# Patient Record
Sex: FEMALE | Race: WHITE | NOT HISPANIC OR LATINO | Employment: FULL TIME | URBAN - METROPOLITAN AREA
[De-identification: names, ages, dates, MRNs, and addresses within clinical notes are randomized per-mention and may not be internally consistent; named-entity substitution may affect disease eponyms.]

---

## 2018-06-25 ENCOUNTER — HOSPITAL ENCOUNTER (EMERGENCY)
Facility: HOSPITAL | Age: 53
Discharge: HOME | End: 2018-06-25
Attending: EMERGENCY MEDICINE
Payer: COMMERCIAL

## 2018-06-25 ENCOUNTER — APPOINTMENT (EMERGENCY)
Dept: CT IMAGING | Facility: HOSPITAL | Age: 53
End: 2018-06-25
Payer: COMMERCIAL

## 2018-06-25 VITALS
OXYGEN SATURATION: 97 % | HEART RATE: 73 BPM | TEMPERATURE: 98.2 F | RESPIRATION RATE: 18 BRPM | DIASTOLIC BLOOD PRESSURE: 78 MMHG | SYSTOLIC BLOOD PRESSURE: 121 MMHG

## 2018-06-25 DIAGNOSIS — H81.10 BENIGN POSITIONAL VERTIGO: Primary | ICD-10-CM

## 2018-06-25 LAB
ANION GAP SERPL CALCULATED.3IONS-SCNC: 7 MMOL/L (ref 4–13)
BASOPHILS # BLD AUTO: 0 THOUSANDS/ΜL (ref 0–0.1)
BASOPHILS NFR BLD AUTO: 0 % (ref 0–1)
BUN SERPL-MCNC: 18 MG/DL (ref 7–25)
CALCIUM SERPL-MCNC: 9.4 MG/DL (ref 8.6–10.5)
CHLORIDE SERPL-SCNC: 106 MMOL/L (ref 98–107)
CO2 SERPL-SCNC: 24 MMOL/L (ref 21–31)
CREAT SERPL-MCNC: 1.13 MG/DL (ref 0.6–1.2)
EOSINOPHIL # BLD AUTO: 0.4 THOUSAND/ΜL (ref 0–0.61)
EOSINOPHIL NFR BLD AUTO: 5 % (ref 0–6)
ERYTHROCYTE [DISTWIDTH] IN BLOOD BY AUTOMATED COUNT: 13.4 % (ref 11.6–15.1)
GFR SERPL CREATININE-BSD FRML MDRD: 56 ML/MIN/1.73SQ M
GLUCOSE SERPL-MCNC: 106 MG/DL (ref 65–140)
GLUCOSE SERPL-MCNC: 108 MG/DL (ref 65–140)
HCT VFR BLD AUTO: 41.2 % (ref 37–47)
HGB BLD-MCNC: 14.3 G/DL (ref 11.5–15.4)
LYMPHOCYTES # BLD AUTO: 2.9 THOUSANDS/ΜL (ref 0.6–4.47)
LYMPHOCYTES NFR BLD AUTO: 40 % (ref 14–44)
MCH RBC QN AUTO: 32.2 PG (ref 26.8–34.3)
MCHC RBC AUTO-ENTMCNC: 34.8 G/DL (ref 31.4–37.4)
MCV RBC AUTO: 92 FL (ref 82–98)
MONOCYTES # BLD AUTO: 0.5 THOUSAND/ΜL (ref 0.17–1.22)
MONOCYTES NFR BLD AUTO: 7 % (ref 4–12)
NEUTROPHILS # BLD AUTO: 3.5 THOUSANDS/ΜL (ref 1.85–7.62)
NEUTS SEG NFR BLD AUTO: 48 % (ref 43–75)
NRBC BLD AUTO-RTO: 1 /100 WBCS
PLATELET # BLD AUTO: 211 THOUSANDS/UL (ref 149–390)
PMV BLD AUTO: 9.7 FL (ref 8.9–12.7)
POTASSIUM SERPL-SCNC: 3.6 MMOL/L (ref 3.5–5.5)
RBC # BLD AUTO: 4.46 MILLION/UL (ref 3.81–5.12)
SODIUM SERPL-SCNC: 137 MMOL/L (ref 134–143)
WBC # BLD AUTO: 7.2 THOUSAND/UL (ref 4.31–10.16)

## 2018-06-25 PROCEDURE — 82948 REAGENT STRIP/BLOOD GLUCOSE: CPT

## 2018-06-25 PROCEDURE — 96374 THER/PROPH/DIAG INJ IV PUSH: CPT

## 2018-06-25 PROCEDURE — 99285 EMERGENCY DEPT VISIT HI MDM: CPT

## 2018-06-25 PROCEDURE — 85025 COMPLETE CBC W/AUTO DIFF WBC: CPT | Performed by: EMERGENCY MEDICINE

## 2018-06-25 PROCEDURE — 70498 CT ANGIOGRAPHY NECK: CPT

## 2018-06-25 PROCEDURE — 70450 CT HEAD/BRAIN W/O DYE: CPT

## 2018-06-25 PROCEDURE — 80048 BASIC METABOLIC PNL TOTAL CA: CPT | Performed by: EMERGENCY MEDICINE

## 2018-06-25 PROCEDURE — 36415 COLL VENOUS BLD VENIPUNCTURE: CPT | Performed by: EMERGENCY MEDICINE

## 2018-06-25 PROCEDURE — 70496 CT ANGIOGRAPHY HEAD: CPT

## 2018-06-25 PROCEDURE — 96361 HYDRATE IV INFUSION ADD-ON: CPT

## 2018-06-25 RX ORDER — MECLIZINE HYDROCHLORIDE 25 MG/1
25 TABLET ORAL ONCE
Status: COMPLETED | OUTPATIENT
Start: 2018-06-25 | End: 2018-06-25

## 2018-06-25 RX ORDER — MECLIZINE HYDROCHLORIDE 25 MG/1
25 TABLET ORAL EVERY 8 HOURS
Qty: 30 TABLET | Refills: 0 | Status: SHIPPED | OUTPATIENT
Start: 2018-06-25

## 2018-06-25 RX ORDER — SODIUM CHLORIDE 9 MG/ML
1000 INJECTION, SOLUTION INTRAVENOUS CONTINUOUS
Status: DISCONTINUED | OUTPATIENT
Start: 2018-06-25 | End: 2018-06-25 | Stop reason: HOSPADM

## 2018-06-25 RX ORDER — DIPHENOXYLATE HYDROCHLORIDE AND ATROPINE SULFATE 2.5; .025 MG/1; MG/1
1 TABLET ORAL DAILY
COMMUNITY

## 2018-06-25 RX ORDER — MECLIZINE HYDROCHLORIDE 25 MG/1
TABLET ORAL
Status: DISCONTINUED
Start: 2018-06-25 | End: 2018-06-25 | Stop reason: HOSPADM

## 2018-06-25 RX ORDER — ONDANSETRON 2 MG/ML
INJECTION INTRAMUSCULAR; INTRAVENOUS
Status: COMPLETED
Start: 2018-06-25 | End: 2018-06-25

## 2018-06-25 RX ORDER — ONDANSETRON 2 MG/ML
4 INJECTION INTRAMUSCULAR; INTRAVENOUS ONCE
Status: COMPLETED | OUTPATIENT
Start: 2018-06-25 | End: 2018-06-25

## 2018-06-25 RX ADMIN — ONDANSETRON 4 MG: 2 INJECTION INTRAMUSCULAR; INTRAVENOUS at 02:24

## 2018-06-25 RX ADMIN — IOHEXOL 100 ML: 350 INJECTION, SOLUTION INTRAVENOUS at 04:17

## 2018-06-25 RX ADMIN — SODIUM CHLORIDE 1000 ML/HR: 0.9 INJECTION, SOLUTION INTRAVENOUS at 04:14

## 2018-06-25 RX ADMIN — ONDANSETRON HYDROCHLORIDE 4 MG: 2 INJECTION, SOLUTION INTRAVENOUS at 02:24

## 2018-06-25 RX ADMIN — MECLIZINE HYDROCHLORIDE 25 MG: 25 TABLET ORAL at 02:24

## 2018-06-25 NOTE — ED NOTES
Patient reports after administration of medications she is starting to feel better - she feels more like she did this morning when the dizziness first started and she could tolerate and function despite the dizziness     Murali Lanza RN  06/25/18 9085

## 2018-06-25 NOTE — ED PROVIDER NOTES
History  Chief Complaint   Patient presents with    Dizziness     reports dizzy since this am - worse this past 1/2 hour - had episode of dizziness back in November that last for 1 week     47year old pt presents with dizziness episode that started earlier yesterday at 6 AM    Pt felt dizzy, off balance  Symptoms persisted and worsened until her arrival to ER  Also complaint of nausea, no tinnituis  Pt has had a prior episode 11 2017 that lasted for one week and self resolved  She did not see a clinician at that time  Prior to Admission Medications   Prescriptions Last Dose Informant Patient Reported? Taking?   multivitamin (THERAGRAN) TABS   Yes Yes   Sig: Take 1 tablet by mouth daily      Facility-Administered Medications: None       History reviewed  No pertinent past medical history  Past Surgical History:   Procedure Laterality Date    TUBAL LIGATION         No family history on file  I have reviewed and agree with the history as documented  Social History   Substance Use Topics    Smoking status: Current Every Day Smoker     Packs/day: 1 00    Smokeless tobacco: Never Used    Alcohol use No        Review of Systems   Constitutional: Negative  HENT: Negative  Eyes: Negative  Respiratory: Negative  Cardiovascular: Negative  Gastrointestinal: Positive for nausea  Endocrine: Negative  Genitourinary: Negative  Musculoskeletal: Positive for gait problem  Pt has difficulty walking and needs to hold on to someone when walking  Neurological: Positive for dizziness and speech difficulty  Negative for tremors, syncope, facial asymmetry, weakness, light-headedness, numbness and headaches  Psychiatric/Behavioral: Negative  Physical Exam  Physical Exam   Constitutional: She appears well-developed and well-nourished  HENT:   Head: Normocephalic and atraumatic  Eyes: Conjunctivae and EOM are normal  Pupils are equal, round, and reactive to light  No nystagmus on position change  Neck: Normal range of motion  Neck supple  Cardiovascular: Normal rate, regular rhythm and normal heart sounds  Pulmonary/Chest: Effort normal and breath sounds normal    Abdominal: Soft  Bowel sounds are normal    Musculoskeletal: Normal range of motion  Neurological: She is alert  Coordination abnormal    Pt is unable to stand without support  And, cannot walk without assistance  Skin: Skin is warm and dry  Psychiatric: She has a normal mood and affect         Vital Signs  ED Triage Vitals   Temperature Pulse Respirations Blood Pressure SpO2   06/25/18 0122 06/25/18 0122 06/25/18 0122 06/25/18 0122 06/25/18 0122   98 2 °F (36 8 °C) 78 18 135/93 97 %      Temp Source Heart Rate Source Patient Position - Orthostatic VS BP Location FiO2 (%)   06/25/18 0122 06/25/18 0305 06/25/18 0305 06/25/18 0122 --   Tympanic Monitor Sitting Left arm       Pain Score       06/25/18 0122       No Pain           Vitals:    06/25/18 0122 06/25/18 0305 06/25/18 0523   BP: 135/93 111/65 121/78   Pulse: 78 78 73   Patient Position - Orthostatic VS:  Sitting        Visual Acuity      ED Medications  Medications   meclizine (ANTIVERT) 25 mg tablet **AcuDose Override Pull** (not administered)   sodium chloride 0 9 % infusion (0 mL/hr Intravenous Stopped 6/25/18 0523)   meclizine (ANTIVERT) tablet 25 mg (25 mg Oral Given 6/25/18 0224)   ondansetron (ZOFRAN) injection 4 mg (4 mg Intravenous Given 6/25/18 0224)   iohexol (OMNIPAQUE) 350 MG/ML injection (SINGLE-DOSE) 100 mL (100 mL Intravenous Given 6/25/18 0417)       Diagnostic Studies  Results Reviewed     Procedure Component Value Units Date/Time    CBC and differential [[id number]] Collected:  06/25/18 0142    Lab Status:  Final result Specimen:  Blood from Arm, Left Updated:  06/25/18 0254     WBC 7 20 Thousand/uL      RBC 4 46 Million/uL      Hemoglobin 14 3 g/dL      Hematocrit 41 2 %      MCV 92 fL      MCH 32 2 pg      MCHC 34 8 g/dL RDW 13 4 %      MPV 9 7 fL      Platelets 091 Thousands/uL      nRBC 1 /100 WBCs      Neutrophils Relative 48 %      Lymphocytes Relative 40 %      Monocytes Relative 7 %      Eosinophils Relative 5 %      Basophils Relative 0 %      Neutrophils Absolute 3 50 Thousands/µL      Lymphocytes Absolute 2 90 Thousands/µL      Monocytes Absolute 0 50 Thousand/µL      Eosinophils Absolute 0 40 Thousand/µL      Basophils Absolute 0 00 Thousands/µL     Basic metabolic panel [[id number]] Collected:  06/25/18 0142    Lab Status:  Final result Specimen:  Blood from Arm, Left Updated:  06/25/18 0230     Sodium 137 mmol/L      Potassium 3 6 mmol/L      Chloride 106 mmol/L      CO2 24 mmol/L      Anion Gap 7 mmol/L      BUN 18 mg/dL      Creatinine 1 13 mg/dL      Glucose 108 mg/dL      Calcium 9 4 mg/dL      eGFR 56 ml/min/1 73sq m     Narrative:         National Kidney Disease Education Program recommendations are as follows:  GFR calculation is accurate only with a steady state creatinine  Chronic Kidney disease less than 60 ml/min/1 73 sq  meters  Kidney failure less than 15 ml/min/1 73 sq  meters  Fingerstick Glucose (POCT) [[id number]]  (Normal) Collected:  06/25/18 0136    Lab Status:  Final result Updated:  06/25/18 0156     POC Glucose 106 mg/dl                  CTA head and neck w wo contrast   Final Result by Lori Butler (06/25 0451)      CT head without contrast   Final Result by Tamica Lindsay (06/25 0242)                 Procedures  Procedures       Phone Contacts  ED Phone Contact    ED Course  ED Course as of Jun 25 0529   Mon Jun 25, 2018   0148 Pt is well outside the window for tPA if these are stroke-like symptoms  I will proceed with CT and document the baseline NIHSS  But no stroke alert will be called  0305 Pt states that she feels 80% improved after the medication  0 I have spo,en with Neurology, Dr Katlyn Sin   Advises CTA Head and Neck    If no abnormalities and minimal to resolution of symptoms may discharge to outpatient follow up  Believes symptoms are more likely BPV vs  Posterior stroke  I am in agreement      0402 Pt has been Oral hydrating prior to the CTA and will receive one liter of fluid IV after the study  1132 I have discussed all findings with pt and spouse at bedside  Follow up with Neurology  Stroke Assessment     Row Name 06/25/18 0207             NIH Stroke Scale    Interval Baseline      Level of Consciousness (1a ) 0      LOC Questions (1b ) 0      LOC Commands (1c ) 0      Best Gaze (2 ) 0      Visual (3 ) 0      Facial Palsy (4 ) 0      Motor Arm, Left (5a ) 0      Motor Arm, Right (5b ) 0      Motor Leg, Left (6a ) 0      Motor Leg, Right (6b ) 0      Limb Ataxia (7 ) 0      Sensory (8 ) 0      Best Language (9 ) 0      Dysarthria (10 ) 0      Extinction and Inattention (11 ) (Formerly Neglect) 0      Total 0                First Filed Value   TPA Decision  Patient not a TPA candidate  MDM  CritCare Time    Disposition  Final diagnoses:   Benign positional vertigo     Time reflects when diagnosis was documented in both MDM as applicable and the Disposition within this note     Time User Action Codes Description Comment    6/25/2018  5:20 AM Tj Pham Add [H81 10] Benign positional vertigo       ED Disposition     ED Disposition Condition Comment    Discharge  Seng Valdez discharge to home/self care  Condition at discharge: Good        Follow-up Information     Follow up With Specialties Details Why Jaqueline Al MD Neurology Schedule an appointment as soon as possible for a visit For follow up of your current symptoms   [address] [place] [address]  [phone]            Patient's Medications   Discharge Prescriptions    MECLIZINE (ANTIVERT) 25 MG TABLET    Take 1 tablet (25 mg total) by mouth every 8 (eight) hours       Start Date: 6/25/2018 End Date: --       Order Dose: 25 mg Quantity: 30 tablet    Refills: 0     No discharge procedures on file      ED Provider  Electronically Signed by           Radha Jenkins MD  06/25/18 3269

## 2018-06-25 NOTE — ED NOTES
Patient requesting to get oob to go to bathroom - md aware of the same - nurse walked with patient - gait steady patient reports she is feeling much better     Jett Cordova RN  06/25/18 7387

## 2018-06-25 NOTE — DISCHARGE INSTRUCTIONS
Benign Paroxysmal Positional Vertigo, Ambulatory Care   GENERAL INFORMATION:   Benign paroxysmal positional vertigo (BPPV)  is an inner ear condition  With BPPV you have paroxysmal (sudden) attacks of vertigo when you change your head position  Vertigo is the sudden feeling that you or the room is moving or spinning  With each attack of vertigo, you may have nystagmus  Nystagmus is a quick, shaky eye movement that you cannot control  The attacks of vertigo and nystagmus last from a few seconds up to 1 minute  Common symptoms include the following:  Head movements, such as looking up or down and bending over, may lead to an attack of vertigo  Vertigo may also occur when you first lie down or roll over in bed  The nystagmus will decrease with vertigo attacks that occur close together  When you have an attack of BPPV, you may also have the following symptoms:  · Changes in your vision    · Nausea or vomiting    · Poor balance or feeling unsteady when you walk  Seek immediate care for the following symptoms:   · A severe headache that does not go away    · New changes in your vision or feeling weak or confused    · Trouble hearing, or ringing or buzzing in your ears    · Symptoms that last longer than 1 minute  Treatment for BPPV  may go away on it's own  Treatments may include head movements or balance therapy  You may need surgery if other treatments have failed  Manage your symptoms:   · Avoid sudden head movements  · Do not bend over at the waist       · Keep your head raised when you lie down  Place pillows under your upper back and head or rest in a recliner  · Try not to stay in bed for long periods of time  Change your position often when you are in bed  Try not to lie with your head on the same side for long periods of time  · Go to vestibular and balance rehabilitation therapy (VBRT)  During VBRT, you learn exercises to improve your balance and strength   VBRT may help decrease your dizziness and prevent injuries if you are at risk for falls  Ask for more information about VBRT and exercises to decrease your symptoms  Follow up with your healthcare provider as directed:  Write down your questions so you remember to ask them during your visits  CARE AGREEMENT:   You have the right to help plan your care  Learn about your health condition and how it may be treated  Discuss treatment options with your caregivers to decide what care you want to receive  You always have the right to refuse treatment  The above information is an  only  It is not intended as medical advice for individual conditions or treatments  Talk to your doctor, nurse or pharmacist before following any medical regimen to see if it is safe and effective for you  © 2014 [address] is for End User's use only and may not be sold, redistributed or otherwise used for commercial purposes  All illustrations and images included in CareNotes® are the copyrighted property of [employer]  or [employer]

## 2018-06-25 NOTE — ED PROCEDURE NOTE
PROCEDURE  ECG 12 Lead Documentation  Date/Time: 6/25/2018 2:36 AM  Performed by: Guy Lau by: Kimberly Dumont     ECG reviewed by me, the ED Provider: yes    Patient location:  ED  Previous ECG:     Comparison to cardiac monitor: No    Interpretation:     Interpretation: normal    Quality:     Tracing quality:  Limited by artifact  Rate:     ECG rate:  77    ECG rate assessment: normal    Rhythm:     Rhythm: sinus rhythm    Ectopy:     Ectopy: none    QRS:     QRS axis:  Normal  Conduction:     Conduction: normal    ST segments:     ST segments:  Normal  T waves:     T waves: normal      No previous EKG's available in [address] [place], [place] [address]Mcnultyysabel Patton MD  06/25/18 1695

## 2018-06-25 NOTE — ED NOTES
MD wanted to assess patient ambulating - patient ambulating with nurse at side- reports feels better walking, but walking in tense position reporting she feels nervous/anxious because of how bad she felt earlier when waking in to er     Tessa Andino RN  06/25/18 4442

## 2018-06-25 NOTE — ED NOTES
ED MD spoke to Mauricio Ocasio neurologist - patient to have CTA of head and neck - MD at patient's bedside informing her of the same     Ara Shah RN  06/25/18 4604

## 2018-06-25 NOTE — ED NOTES
Patient reports she is feeling better - no longer has fuzzy feeling, nausea has improved, dizziness has subsided     Ara Shah RN  06/25/18 8241

## 2019-10-10 ENCOUNTER — APPOINTMENT (OUTPATIENT)
Dept: RADIOLOGY | Facility: HOSPITAL | Age: 54
End: 2019-10-10
Payer: COMMERCIAL

## 2019-10-10 ENCOUNTER — APPOINTMENT (OUTPATIENT)
Dept: LAB | Facility: HOSPITAL | Age: 54
End: 2019-10-10
Payer: COMMERCIAL

## 2019-10-10 ENCOUNTER — TRANSCRIBE ORDERS (OUTPATIENT)
Dept: ADMINISTRATIVE | Facility: HOSPITAL | Age: 54
End: 2019-10-10

## 2019-10-10 DIAGNOSIS — Z11.59 SCREENING EXAMINATION FOR POLIOMYELITIS: ICD-10-CM

## 2019-10-10 DIAGNOSIS — Z00.00 ROUTINE GENERAL MEDICAL EXAMINATION AT A HEALTH CARE FACILITY: ICD-10-CM

## 2019-10-10 DIAGNOSIS — M25.50 PAIN IN JOINT, MULTIPLE SITES: Primary | ICD-10-CM

## 2019-10-10 DIAGNOSIS — M25.50 PAIN IN JOINT, MULTIPLE SITES: ICD-10-CM

## 2019-10-10 LAB
ALBUMIN SERPL BCP-MCNC: 4.5 G/DL (ref 3.5–5)
ALP SERPL-CCNC: 106 U/L (ref 46–116)
ALT SERPL W P-5'-P-CCNC: 29 U/L (ref 12–78)
ANION GAP SERPL CALCULATED.3IONS-SCNC: 6 MMOL/L (ref 4–13)
AST SERPL W P-5'-P-CCNC: 18 U/L (ref 5–45)
BASOPHILS # BLD AUTO: 0.03 THOUSANDS/ΜL (ref 0–0.1)
BASOPHILS NFR BLD AUTO: 1 % (ref 0–1)
BILIRUB SERPL-MCNC: 0.4 MG/DL (ref 0.2–1)
BUN SERPL-MCNC: 13 MG/DL (ref 5–25)
CALCIUM SERPL-MCNC: 9.5 MG/DL (ref 8.3–10.1)
CHLORIDE SERPL-SCNC: 105 MMOL/L (ref 100–108)
CHOLEST SERPL-MCNC: 231 MG/DL (ref 50–200)
CO2 SERPL-SCNC: 27 MMOL/L (ref 21–32)
CREAT SERPL-MCNC: 1.01 MG/DL (ref 0.6–1.3)
EOSINOPHIL # BLD AUTO: 0.27 THOUSAND/ΜL (ref 0–0.61)
EOSINOPHIL NFR BLD AUTO: 4 % (ref 0–6)
ERYTHROCYTE [DISTWIDTH] IN BLOOD BY AUTOMATED COUNT: 13.2 % (ref 11.6–15.1)
GFR SERPL CREATININE-BSD FRML MDRD: 63 ML/MIN/1.73SQ M
GLUCOSE P FAST SERPL-MCNC: 89 MG/DL (ref 65–99)
HCT VFR BLD AUTO: 44.2 % (ref 34.8–46.1)
HCV AB SER QL: NORMAL
HDLC SERPL-MCNC: 50 MG/DL (ref 40–60)
HGB BLD-MCNC: 14.1 G/DL (ref 11.5–15.4)
IMM GRANULOCYTES # BLD AUTO: 0.01 THOUSAND/UL (ref 0–0.2)
IMM GRANULOCYTES NFR BLD AUTO: 0 % (ref 0–2)
LDLC SERPL CALC-MCNC: 152 MG/DL (ref 0–100)
LYMPHOCYTES # BLD AUTO: 1.81 THOUSANDS/ΜL (ref 0.6–4.47)
LYMPHOCYTES NFR BLD AUTO: 30 % (ref 14–44)
MCH RBC QN AUTO: 30.5 PG (ref 26.8–34.3)
MCHC RBC AUTO-ENTMCNC: 31.9 G/DL (ref 31.4–37.4)
MCV RBC AUTO: 96 FL (ref 82–98)
MONOCYTES # BLD AUTO: 0.4 THOUSAND/ΜL (ref 0.17–1.22)
MONOCYTES NFR BLD AUTO: 7 % (ref 4–12)
NEUTROPHILS # BLD AUTO: 3.55 THOUSANDS/ΜL (ref 1.85–7.62)
NEUTS SEG NFR BLD AUTO: 58 % (ref 43–75)
NONHDLC SERPL-MCNC: 181 MG/DL
NRBC BLD AUTO-RTO: 0 /100 WBCS
PLATELET # BLD AUTO: 267 THOUSANDS/UL (ref 149–390)
PMV BLD AUTO: 10.3 FL (ref 8.9–12.7)
POTASSIUM SERPL-SCNC: 4.1 MMOL/L (ref 3.5–5.3)
PROT SERPL-MCNC: 8.2 G/DL (ref 6.4–8.2)
RBC # BLD AUTO: 4.63 MILLION/UL (ref 3.81–5.12)
SODIUM SERPL-SCNC: 138 MMOL/L (ref 136–145)
TRIGL SERPL-MCNC: 145 MG/DL
TSH SERPL DL<=0.05 MIU/L-ACNC: 9.93 UIU/ML (ref 0.36–3.74)
WBC # BLD AUTO: 6.07 THOUSAND/UL (ref 4.31–10.16)

## 2019-10-10 PROCEDURE — 86803 HEPATITIS C AB TEST: CPT

## 2019-10-10 PROCEDURE — 85025 COMPLETE CBC W/AUTO DIFF WBC: CPT

## 2019-10-10 PROCEDURE — 73130 X-RAY EXAM OF HAND: CPT

## 2019-10-10 PROCEDURE — 84443 ASSAY THYROID STIM HORMONE: CPT

## 2019-10-10 PROCEDURE — 80061 LIPID PANEL: CPT

## 2019-10-10 PROCEDURE — 80053 COMPREHEN METABOLIC PANEL: CPT

## 2019-10-10 PROCEDURE — 86038 ANTINUCLEAR ANTIBODIES: CPT

## 2019-10-10 PROCEDURE — 86430 RHEUMATOID FACTOR TEST QUAL: CPT

## 2019-10-10 PROCEDURE — 36415 COLL VENOUS BLD VENIPUNCTURE: CPT

## 2019-10-11 LAB
RHEUMATOID FACT SER QL LA: NEGATIVE
RYE IGE QN: NEGATIVE

## 2019-10-12 ENCOUNTER — OFFICE VISIT (OUTPATIENT)
Dept: URGENT CARE | Facility: HOSPITAL | Age: 54
End: 2019-10-12
Payer: COMMERCIAL

## 2019-10-12 VITALS
SYSTOLIC BLOOD PRESSURE: 115 MMHG | DIASTOLIC BLOOD PRESSURE: 79 MMHG | HEIGHT: 66 IN | TEMPERATURE: 97.8 F | OXYGEN SATURATION: 94 % | HEART RATE: 97 BPM | RESPIRATION RATE: 18 BRPM | WEIGHT: 181.1 LBS | BODY MASS INDEX: 29.11 KG/M2
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
## 2019-10-12 DIAGNOSIS — S30.860A TICK BITE OF BACK, INITIAL ENCOUNTER: Primary | ICD-10-CM

## 2019-10-12 PROCEDURE — 90715 TDAP VACCINE 7 YRS/> IM: CPT

## 2019-10-12 PROCEDURE — 90471 IMMUNIZATION ADMIN: CPT | Performed by: EMERGENCY MEDICINE

## 2019-10-12 PROCEDURE — 99203 OFFICE O/P NEW LOW 30 MIN: CPT | Performed by: EMERGENCY MEDICINE

## 2019-10-12 RX ORDER — DOXYCYCLINE 100 MG/1
200 TABLET ORAL ONCE
Qty: 2 TABLET | Refills: 0 | Status: SHIPPED | OUTPATIENT
Start: 2019-10-12 | End: 2019-10-12

## 2019-10-12 NOTE — PATIENT INSTRUCTIONS
Tick Bite   WHAT YOU NEED TO KNOW:   Most tick bites are not dangerous, but ticks can pass disease or infection when they bite  Ticks need to be removed quickly  You may have redness, pain, itching, and swelling near the bite  Blisters may also develop  DISCHARGE INSTRUCTIONS:   Return to the emergency department if:   · You have trouble walking or moving your legs  · You have joint pain, muscle pain, or muscle weakness within 1 month of a tick bite  · You have a fever, chills, headache, or rash  Contact your healthcare provider if:   · You cannot remove the tick  · The tick's head is stuck in your skin  · You have questions or concerns about your condition or care  Medicines:   · Medicines  help decrease pain, redness, itching, and swelling  You may also need medicine to prevent or fight a bacterial infection  These medicines may be given as a cream, lotion, or pill  · Take your medicine as directed  Contact your healthcare provider if you think your medicine is not helping or if you have side effects  Tell him of her if you are allergic to any medicine  Keep a list of the medicines, vitamins, and herbs you take  Include the amounts, and when and why you take them  Bring the list or the pill bottles to follow-up visits  Carry your medicine list with you in case of an emergency  How to remove a tick:  Remove the tick as soon as possible to help prevent disease or infection  You are less likely to get sick from a tick bite if you remove the tick within 24 hours  Do not use petroleum jelly, nail polish, rubbing alcohol, or heat  These do not work and may be dangerous  Do the following to remove a tick:  · First, try a soapy cotton ball  Soak a cotton ball in liquid soap  Cover the tick with the cotton ball for 30 seconds  The tick may come off with the cotton ball when you pull it away  · Use tweezers if the soapy cotton ball does not work  Grasp the tick as close to your skin as possible  Pull the tick straight up and out  Do not touch the tick with your bare hands  · Do not twist or jerk the tick suddenly, because this may break off the tick's head or mouth parts  Do not leave any part of the tick in your skin  · Do not crush or squeeze the tick since its body may be infected with germs  Flush the tick down the toilet  · After the tick is removed, clean the area of the bite with rubbing alcohol  Then wash your hands with soap and water  Apply ice  on your bite for 15 to 20 minutes every hour or as directed  Use an ice pack, or put crushed ice in a plastic bag  Cover it with a towel before you apply it to your skin  Ice helps prevent tissue damage and decreases swelling and pain  Prevent a tick bite:  Ticks live in areas covered by brush and grass  They may even be found in your lawn if you live in certain areas  Outdoor pets can carry ticks inside the house  Ticks can grab onto you or your clothes when you walk by grass or brush  If you go into areas that contain many trees, tall grasses, and underbrush, do the following:  · Wear light colored pants and a long-sleeved shirt  Tuck your pants into your socks or boots  Tuck in your shirt  Wear sleeves that fit close to the skin at your wrists and neck  This will help prevent ticks from crawling through gaps in your clothing and onto your skin  Wear a hat in areas with trees  · Apply insect repellant on your skin  The insect repellant should contain DEET  Do not put insect repellant on skin that is cut, scratched, or irritated  Always use soap and water to wash the insect repellant off as soon as possible once you are indoors  Do not apply insect repellant on your child's face or hands  · Spray insect repellant onto your clothes  Use permethrin spray  This spray kills ticks that crawl on your clothing  Be sure to spray the tops of your boots, bottom of pant legs, and sleeve cuffs   As soon as possible, wash and dry clothing in hot water and high heat  · Check your clothing, hair, and skin for ticks  Shower within 2 hours of coming indoors  Carefully check the hairline, armpits, neck, and waist  Check your pets and children for ticks  Remove ticks from pets the same way as you remove them from people  · Decrease the risk for ticks in your yard  Ticks like to live in shady, moist areas  Liza Donna your lawn regularly to keep the grass short  Trim the grass around birdbaths and fences  Cut branches that are overgrown and take them out of the yard  Clear out leaf piles  Ellen Miss firewood in a dry, rio area  Follow up with your healthcare provider as directed:  Write down your questions so you remember to ask them during your visits  © 2017 [address] Information is for End User's use only and may not be sold, redistributed or otherwise used for commercial purposes  All illustrations and images included in CareNotes® are the copyrighted property of [employer]  or [employer]  The above information is an  only  It is not intended as medical advice for individual conditions or treatments  Talk to your doctor, nurse or pharmacist before following any medical regimen to see if it is safe and effective for you

## 2021-10-20 ENCOUNTER — OFFICE VISIT (OUTPATIENT)
Dept: URGENT CARE | Facility: CLINIC | Age: 56
End: 2021-10-20
Payer: COMMERCIAL

## 2021-10-20 VITALS
WEIGHT: 180 LBS | RESPIRATION RATE: 18 BRPM | SYSTOLIC BLOOD PRESSURE: 140 MMHG | DIASTOLIC BLOOD PRESSURE: 83 MMHG | HEIGHT: 65 IN | TEMPERATURE: 99.2 F | HEART RATE: 77 BPM | BODY MASS INDEX: 29.99 KG/M2 | OXYGEN SATURATION: 96 %
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
## 2021-10-20 DIAGNOSIS — A69.20: ICD-10-CM

## 2021-10-20 DIAGNOSIS — S30.860A TICK BITE OF PELVIC REGION, INITIAL ENCOUNTER: Primary | ICD-10-CM

## 2021-10-20 PROCEDURE — 99213 OFFICE O/P EST LOW 20 MIN: CPT | Performed by: FAMILY MEDICINE

## 2021-10-20 RX ORDER — DOXYCYCLINE 100 MG/1
100 CAPSULE ORAL 2 TIMES DAILY
Qty: 42 CAPSULE | Refills: 0 | Status: SHIPPED | OUTPATIENT
Start: 2021-10-20 | End: 2021-11-10

## 2024-11-20 ENCOUNTER — OFFICE VISIT (OUTPATIENT)
Dept: URGENT CARE | Facility: CLINIC | Age: 59
End: 2024-11-20
Payer: COMMERCIAL

## 2024-11-20 ENCOUNTER — APPOINTMENT (OUTPATIENT)
Dept: RADIOLOGY | Facility: CLINIC | Age: 59
End: 2024-11-20
Payer: COMMERCIAL

## 2024-11-20 VITALS
HEART RATE: 96 BPM | RESPIRATION RATE: 16 BRPM | WEIGHT: 192.8 LBS | BODY MASS INDEX: 32.08 KG/M2 | OXYGEN SATURATION: 98 % | SYSTOLIC BLOOD PRESSURE: 127 MMHG | TEMPERATURE: 98.4 F | DIASTOLIC BLOOD PRESSURE: 75 MMHG

## 2024-11-20 DIAGNOSIS — M25.561 RIGHT KNEE PAIN, UNSPECIFIED CHRONICITY: Primary | ICD-10-CM

## 2024-11-20 DIAGNOSIS — M25.561 RIGHT KNEE PAIN, UNSPECIFIED CHRONICITY: ICD-10-CM

## 2024-11-20 PROCEDURE — 73564 X-RAY EXAM KNEE 4 OR MORE: CPT

## 2024-11-20 PROCEDURE — 99204 OFFICE O/P NEW MOD 45 MIN: CPT | Performed by: NURSE PRACTITIONER

## 2024-11-20 RX ORDER — MELOXICAM 15 MG/1
15 TABLET ORAL DAILY
Qty: 21 TABLET | Refills: 0 | Status: SHIPPED | OUTPATIENT
Start: 2024-11-20

## 2024-11-20 NOTE — PATIENT INSTRUCTIONS
"Patient Education     Knee pain   The Basics   Written by the doctors and editors at [facility]   What causes knee pain? -- Many different conditions can cause knee pain. Some of the most common are listed below.   Bending or using the knee too much - This can cause pain in the front of the knee that worsens with running, climbing steps, or sitting for a long time.   Arthritis - Arthritis is a general term that means inflammation of the joints. There are lots of types of arthritis. The most common type, called osteoarthritis, often comes with age. It can cause pain, stiffness, and swelling (figure 1).   Bursitis - Bursitis happens when fluid-filled sacs around the knee (called \"bursae\") get irritated or swollen (figure 2). Bursitis can cause pain and swelling.   A collection of fluid in the knee - This can happen after a knee injury.   A tear in the meniscus - The meniscus is a cushion of rubbery material (cartilage) between the thigh bone and the leg bone (figure 3).   A tear in a ligament - Ligaments are bands of tissue that connect 1 bone to another. There are 4 ligaments in each knee (figure 3).   Muscle strain - Different leg muscles move the knee joint, causing the knee to bend and straighten. If 1 of these muscles or its tendon doesn't work well, moving the knee can cause pain. (Tendons are bands of tissue the connect muscles to bones.)   Other knee injuries, a knee joint infection, or a condition called gout, which causes crystals to form inside joints   Conditions that don't involve the knee - For example, problems in the hip can sometimes cause knee pain.  Is there anything I can do on my own to feel better? -- Yes. To ease your symptoms, you can:   Put ice on the knee to reduce pain and swelling - For the first few weeks after an injury, or after an activity that makes your pain worse, you can try icing your knee. Put a cold gel pack, bag of ice, or bag of frozen vegetables on the injured area every 1 to 2 " "hours, for 15 minutes each time. Put a thin towel between the ice (or other cold object) and your skin. To reduce swelling, sit or lie down and raise your leg above the level of your heart when you put ice on it.   Rest your knee and avoid movements that worsen the pain - Try not to squat, kneel, or run. Also, don't use exercise machines, such as stair steppers or rowing machines. Instead, you can walk or swim (the front and back crawl strokes) for exercise.   Take a pain-relieving medicine, such as acetaminophen (sample brand name: Tylenol) or ibuprofen (sample brand names: Advil, Motrin).  Should I see a doctor or nurse? -- See your doctor or nurse if:   You are unable to put weight on your knee, your knee \"locks\" in place, or your knee \"gives out\"   Your knee is very swollen and painful   You have a fever with knee pain, swelling, and redness   Your knee pain doesn't get better or gets worse after you treat it on your own for a few days  How is knee pain treated? -- The right treatment for knee pain depends on what is causing it. Treatments might include:   Wearing a knee brace or shoe insert   Doing exercises to strengthen and stretch the muscles that move the knee joint - Ask your doctor or nurse which exercises can help with the cause of your pain.   Having physical therapy   Losing weight, if needed - Talk to your doctor or nurse about whether losing weight might help your knee pain. They can help you lose weight in a healthy way.   Getting a shot of medicine in the knee   Other medicines   Surgery  All topics are updated as new evidence becomes available and our peer review process is complete.  This topic retrieved from [employer] on: Apr 25, 2024.  Topic 15540 Version 15.0  Release: 32.4.2 - C32.114  © 2024 [employer] and/or its affiliates. All rights reserved.  figure 1: Knee osteoarthritis     This drawing shows a normal knee joint next to a knee joint with osteoarthritis. In the osteoarthritis joint, " "the cartilage covering the ends of the bones roughens and becomes thin, while the bone underneath the cartilage grows thicker. Bony growths called \"osteophytes\" can form. The space between the bones also becomes narrower.  Graphic 411965 Version 3.0  figure 2: Knee bursa (prepatellar bursa)     Graphic 18749 Version 3.0  figure 3: Front view of the knee     This drawing shows the inner parts of the knee as seen from the front. A small bone (called the patella or the \"knee cap\") that sits in front of the knee has been removed so that you can see what is under that bone. The anterior cruciate ligament (ACL) is in the middle in white. It connects the thigh bone (called the \"femur\") to the shin bone (called the \"tibia\"). The meniscus is a cushion of rubbery material (cartilage) between the thigh bone and the shin bone.  Graphic 04625 Version 5.0  Consumer Information Use and Disclaimer   Disclaimer: This generalized information is a limited summary of diagnosis, treatment, and/or medication information. It is not meant to be comprehensive and should be used as a tool to help the user understand and/or assess potential diagnostic and treatment options. It does NOT include all information about conditions, treatments, medications, side effects, or risks that may apply to a specific patient. It is not intended to be medical advice or a substitute for the medical advice, diagnosis, or treatment of a health care provider based on the health care provider's examination and assessment of a patient's specific and unique circumstances. Patients must speak with a health care provider for complete information about their health, medical questions, and treatment options, including any risks or benefits regarding use of medications. This information does not endorse any treatments or medications as safe, effective, or approved for treating a specific patient. Up[employer], Inc. and its affiliates disclaim any warranty or liability " relating to this information or the use thereof.The use of this information is governed by the Terms of Use, available at https://www.wolterskluwer.com/en/know/clinical-effectiveness-terms. 2024© UpToDate, Inc. and its affiliates and/or licensors. All rights reserved.  Copyright   © 2024 [employer] and/or its affiliates. All rights reserved.

## 2024-11-20 NOTE — PROGRESS NOTES
"  [facility]        NAME: Jessa Siddiqui is a 59 y.o. female  : 1965    MRN: [id number]  DATE: 2024  TIME: 5:14 PM      Assessment and Plan     Right knee pain, unspecified chronicity [M25.561]  1. Right knee pain, unspecified chronicity  Ambulatory Referral to Orthopedic Surgery    Ambulatory Referral to Family Practice    XR knee 4+ vw right injury    meloxicam (Mobic) 15 mg tablet    Elastic Bandages & Supports (Knee Brace) MISC    DISCONTINUED: Diclofenac Sodium (VOLTAREN) 1 %    DISCONTINUED: Elastic Bandages & Supports (Knee Brace) MISC    CANCELED: XR knee 4+ vw left injury        Patient fitted for as needed hinged knee brace by nursing, tolerated well.  Reports that she is still in a lot of pain but that this does feel little bit better than the support sleeve she had tried over-the-counter.    Patient Instructions     Patient Instructions   Patient Education     Knee pain   The Basics   Written by the doctors and editors at [employer]   What causes knee pain? -- Many different conditions can cause knee pain. Some of the most common are listed below.   Bending or using the knee too much - This can cause pain in the front of the knee that worsens with running, climbing steps, or sitting for a long time.   Arthritis - Arthritis is a general term that means inflammation of the joints. There are lots of types of arthritis. The most common type, called osteoarthritis, often comes with age. It can cause pain, stiffness, and swelling (figure 1).   Bursitis - Bursitis happens when fluid-filled sacs around the knee (called \"bursae\") get irritated or swollen (figure 2). Bursitis can cause pain and swelling.   A collection of fluid in the knee - This can happen after a knee injury.   A tear in the meniscus - The meniscus is a cushion of rubbery material (cartilage) between the thigh bone and the leg bone (figure 3).   A tear in a ligament - Ligaments are bands of tissue that connect 1 bone " "to another. There are 4 ligaments in each knee (figure 3).   Muscle strain - Different leg muscles move the knee joint, causing the knee to bend and straighten. If 1 of these muscles or its tendon doesn't work well, moving the knee can cause pain. (Tendons are bands of tissue the connect muscles to bones.)   Other knee injuries, a knee joint infection, or a condition called gout, which causes crystals to form inside joints   Conditions that don't involve the knee - For example, problems in the hip can sometimes cause knee pain.  Is there anything I can do on my own to feel better? -- Yes. To ease your symptoms, you can:   Put ice on the knee to reduce pain and swelling - For the first few weeks after an injury, or after an activity that makes your pain worse, you can try icing your knee. Put a cold gel pack, bag of ice, or bag of frozen vegetables on the injured area every 1 to 2 hours, for 15 minutes each time. Put a thin towel between the ice (or other cold object) and your skin. To reduce swelling, sit or lie down and raise your leg above the level of your heart when you put ice on it.   Rest your knee and avoid movements that worsen the pain - Try not to squat, kneel, or run. Also, don't use exercise machines, such as stair steppers or rowing machines. Instead, you can walk or swim (the front and back crawl strokes) for exercise.   Take a pain-relieving medicine, such as acetaminophen (sample brand name: Tylenol) or ibuprofen (sample brand names: Advil, Motrin).  Should I see a doctor or nurse? -- See your doctor or nurse if:   You are unable to put weight on your knee, your knee \"locks\" in place, or your knee \"gives out\"   Your knee is very swollen and painful   You have a fever with knee pain, swelling, and redness   Your knee pain doesn't get better or gets worse after you treat it on your own for a few days  How is knee pain treated? -- The right treatment for knee pain depends on what is causing it. " "Treatments might include:   Wearing a knee brace or shoe insert   Doing exercises to strengthen and stretch the muscles that move the knee joint - Ask your doctor or nurse which exercises can help with the cause of your pain.   Having physical therapy   Losing weight, if needed - Talk to your doctor or nurse about whether losing weight might help your knee pain. They can help you lose weight in a healthy way.   Getting a shot of medicine in the knee   Other medicines   Surgery  All topics are updated as new evidence becomes available and our peer review process is complete.  This topic retrieved from [employer] on: Apr 25, 2024.  Topic 15925 Version 15.0  Release: 32.4.2 - C32.114  © 2024 [employer] and/or its affiliates. All rights reserved.  figure 1: Knee osteoarthritis     This drawing shows a normal knee joint next to a knee joint with osteoarthritis. In the osteoarthritis joint, the cartilage covering the ends of the bones roughens and becomes thin, while the bone underneath the cartilage grows thicker. Bony growths called \"osteophytes\" can form. The space between the bones also becomes narrower.  Graphic [id number] Version 3.0  figure 2: Knee bursa (prepatellar bursa)     Graphic 95616 Version 3.0  figure 3: Front view of the knee     This drawing shows the inner parts of the knee as seen from the front. A small bone (called the patella or the \"knee cap\") that sits in front of the knee has been removed so that you can see what is under that bone. The anterior cruciate ligament (ACL) is in the middle in white. It connects the thigh bone (called the \"femur\") to the shin bone (called the \"tibia\"). The meniscus is a cushion of rubbery material (cartilage) between the thigh bone and the shin bone.  Graphic 28905 Version 5.0  Consumer Information Use and Disclaimer   Disclaimer: This generalized information is a limited summary of diagnosis, treatment, and/or medication information. It is not meant to be " comprehensive and should be used as a tool to help the user understand and/or assess potential diagnostic and treatment options. It does NOT include all information about conditions, treatments, medications, side effects, or risks that may apply to a specific patient. It is not intended to be medical advice or a substitute for the medical advice, diagnosis, or treatment of a health care provider based on the health care provider's examination and assessment of a patient's specific and unique circumstances. Patients must speak with a health care provider for complete information about their health, medical questions, and treatment options, including any risks or benefits regarding use of medications. This information does not endorse any treatments or medications as safe, effective, or approved for treating a specific patient. UpToDate, Inc. and its affiliates disclaim any warranty or liability relating to this information or the use thereof.The use of this information is governed by the Terms of Use, available at https://www.[employer]acon/en/know/clinical-effectiveness-terms. 2024© UpToDate, Inc. and its affiliates and/or licensors. All rights reserved.  Copyright   © 2024 Up[employer]Date, Inc. and/or its affiliates. All rights reserved.      Follow up with PCP in 3-5 days.  Proceed to  ER if symptoms worsen.    Chief Complaint     Chief Complaint   Patient presents with    Knee Pain     Chronic Right knee pain  started hurting worse the end of October. No known injury          History of Present Illness     Right knee pain off/on for years.  Worse since late Oct--went camping, was kneeling down hammering many tent stakes.  Did hit ones of her knees with the hammer--cannot remember which ones (which is why this is ordered as an injury). In the last month has tried numerous topical gels without relief, Tylenol, ibuprofen, and 1 tab of Mobic from her  all without any significant relief.  She had also tried an  "over-the-counter support wrap/sleeve for her knee without relief.  She notes that she is usually very active, up and down without any difficulty usually sitting crosslegged.  She notes that she has significant pain along the medial and posterior aspects of her knee and she even tries to bend her knee and has a lot of trouble trying to get up and down off the floor.  She has also been experiencing intermittent tingling from her knee down to her foot.    No current PCP.  She notes that she needs to but expresses frustration that they keep taking her off the list as active patient.  We discussed that while a private practice may set their own role, both networks in this area and most practices follow guideline of 12 months to be an active patient in 3 years to be considered a current patient--meaning that forearms, meds, etc. could potentially be called in during that 12-month span although a visit may be required depending on the concern; after 3 years most practices will put the patient's information into the archives and not consider them an active patient.  Explained why the initial new patient visit is longer.  Explained that is both reviewing her health history, any symptoms and concerns, but also family health history since that impacts screening and treatment guidelines.  We discussed that when it is longer than 3 years, a lot of information could potentially change, and it is often not realistic to fit all of the information in a shorter appointment slot.  We also discussed that PCPs have what is called a \"panel\" which is their total roster of patients and that this roster cannot be indefinite.  For practical reasons when they reach out to a previously current patient who does not wish to continue care at that point, after the 3 years they will typically remove them from the active list to free up a slot.  The size of this panel is how many practices determine whether or not that provider is accepting new " patients right then or not.  Discussed that even if she feels well, that is important to do screening test because a lot of things can start creeping in without obvious symptoms.  Patient expresses understanding.  Wishes to start by establishing with orthopedic due to the acute knee pain but is open to establishing with a PCP as well.  We discussed that she may need the general visit as well since it is difficult to safely prescribe medication without having any recent labs to make sure that kidney function, liver function is okay as well as whether or not there is underlying diabetes.  She states her understanding.        Review of Systems     Review of Systems   Musculoskeletal:  Positive for arthralgias. Negative for joint swelling.   Neurological:  Positive for numbness (Partial, paresthesia/not total numbness).   All other systems reviewed and are negative.        Current Medications       Current Outpatient Medications:     Elastic Bandages & Supports (Knee Brace) MISC, Use daily as needed (pain), Disp: 1 each, Rfl: 0    meloxicam (Mobic) 15 mg tablet, Take 1 tablet (15 mg total) by mouth daily, Disp: 21 tablet, Rfl: 0    meclizine (ANTIVERT) 25 mg tablet, Take 1 tablet (25 mg total) by mouth every 8 (eight) hours (Patient not taking: Reported on 11/20/2024), Disp: 30 tablet, Rfl: 0    multivitamin (THERAGRAN) TABS, Take 1 tablet by mouth daily (Patient not taking: Reported on 11/20/2024), Disp: , Rfl:     Current Allergies     Allergies as of 11/20/2024    (No Known Allergies)              The following portions of the patient's history were reviewed and updated as appropriate: allergies, current medications, past family history, past medical history, past social history, past surgical history and problem list.     Past Medical History:   Diagnosis Date    Arthritis        Past Surgical History:   Procedure Laterality Date    TUBAL LIGATION         Family History   Problem Relation Age of Onset    Arthritis  Mother     Hypertension Father     Heart failure Father          Medications have been verified.        Objective     /75   Pulse 96   Temp 98.4 °F (36.9 °C)   Resp 16   Wt 87.5 kg (192 lb 12.8 oz)   SpO2 98%   BMI 32.08 kg/m²   No LMP recorded.         Physical Exam     Physical Exam  Vitals and nursing note reviewed.   Constitutional:       General: She is not in acute distress.     Appearance: Normal appearance. She is well-developed. She is not ill-appearing, toxic-appearing or diaphoretic.   HENT:      Head: Normocephalic and atraumatic.   Pulmonary:      Effort: Pulmonary effort is normal. No respiratory distress.   Musculoskeletal:      Right knee: Bony tenderness present. No swelling, effusion, ecchymosis or crepitus. Decreased range of motion. Tenderness present over the MCL, ACL and PCL. Normal alignment.   Skin:     General: Skin is warm and dry.      Capillary Refill: Capillary refill takes less than 2 seconds.   Neurological:      General: No focal deficit present.      Mental Status: She is alert and oriented to person, place, and time.   Psychiatric:         Mood and Affect: Mood and affect normal.         Behavior: Behavior normal. Behavior is cooperative.         Thought Content: Thought content normal.         Judgment: Judgment normal.

## 2024-11-25 ENCOUNTER — OFFICE VISIT (OUTPATIENT)
Dept: OBGYN CLINIC | Facility: CLINIC | Age: 59
End: 2024-11-25
Payer: COMMERCIAL

## 2024-11-25 VITALS
HEART RATE: 94 BPM | SYSTOLIC BLOOD PRESSURE: 119 MMHG | BODY MASS INDEX: 30.86 KG/M2 | WEIGHT: 192 LBS | HEIGHT: 66 IN | DIASTOLIC BLOOD PRESSURE: 83 MMHG

## 2024-11-25 DIAGNOSIS — S83.241A ACUTE MEDIAL MENISCUS TEAR OF RIGHT KNEE, INITIAL ENCOUNTER: ICD-10-CM

## 2024-11-25 DIAGNOSIS — M25.561 RIGHT KNEE PAIN, UNSPECIFIED CHRONICITY: Primary | ICD-10-CM

## 2024-11-25 PROCEDURE — 20610 DRAIN/INJ JOINT/BURSA W/O US: CPT | Performed by: ORTHOPAEDIC SURGERY

## 2024-11-25 PROCEDURE — 99203 OFFICE O/P NEW LOW 30 MIN: CPT | Performed by: ORTHOPAEDIC SURGERY

## 2024-11-25 RX ORDER — BUPIVACAINE HYDROCHLORIDE 2.5 MG/ML
4 INJECTION, SOLUTION INFILTRATION; PERINEURAL
Status: COMPLETED | OUTPATIENT
Start: 2024-11-25 | End: 2024-11-25

## 2024-11-25 RX ORDER — TRIAMCINOLONE ACETONIDE 40 MG/ML
80 INJECTION, SUSPENSION INTRA-ARTICULAR; INTRAMUSCULAR
Status: COMPLETED | OUTPATIENT
Start: 2024-11-25 | End: 2024-11-25

## 2024-11-25 RX ADMIN — TRIAMCINOLONE ACETONIDE 80 MG: 40 INJECTION, SUSPENSION INTRA-ARTICULAR; INTRAMUSCULAR at 14:45

## 2024-11-25 RX ADMIN — BUPIVACAINE HYDROCHLORIDE 4 ML: 2.5 INJECTION, SOLUTION INFILTRATION; PERINEURAL at 14:45

## 2024-11-25 NOTE — PROGRESS NOTES
Assessment/Plan:  Assessment & Plan   Diagnoses and all orders for this visit:    Right knee pain, unspecified chronicity  -     Ambulatory Referral to Orthopedic Surgery    Acute medial meniscus tear of right knee, initial encounter  -     MRI knee right  wo contrast; Future        The right knee was injected with Kenalog and Marcaine.  She tolerated procedure quite well.  Due to her mechanical symptomatology.  An MRI was ordered of her right knee.  Return back once MRI is complete    Subjective:   Patient ID: Jessa Siddiqui is a 59 y.o. female.    HPI    Patient has been having trouble with her right knee for the past year but got worse over the past months.  Her knee does feel unstable and wants to buckle.  She did have x-rays which were negative.  Denies any numbness or tingling.  She denies any fever or chills    The following portions of the patient's history were reviewed and updated as appropriate: allergies, current medications, past family history, past medical history, past social history, past surgical history, and problem list.    Review of Systems   Constitutional:  Negative for chills, fever and unexpected weight change.   HENT:  Negative for hearing loss, nosebleeds and sore throat.    Eyes:  Negative for pain, redness and visual disturbance.   Respiratory:  Negative for cough, shortness of breath and wheezing.    Cardiovascular:  Negative for chest pain, palpitations and leg swelling.   Gastrointestinal:  Negative for abdominal pain, nausea and vomiting.   Endocrine: Negative for polydipsia and polyuria.   Genitourinary:  Negative for dysuria and hematuria.   Musculoskeletal:  Positive for arthralgias, gait problem and myalgias. Negative for back pain, joint swelling, neck pain and neck stiffness.        As noted in HPI   Skin:  Negative for rash and wound.   Neurological:  Negative for dizziness, numbness and headaches.   Psychiatric/Behavioral:  Negative for decreased concentration and suicidal  "ideas. The patient is not nervous/anxious.        Objective:  Ortho Exam    Right lower extremity is neurovascularly intact.  Toes are pink and mobile.  Compartments are soft.  Range of motion of her knee is from 5 to 115 degrees.  There is a negative Lachman, drawer, pivot shift test.  There is medial joint tenderness, lateral joint tenderness, and mild crepitation.  A positive Sharmaine's is present.  Negative Homans    I have personally reviewed pertinent films in PACS.    X-rays show no fractures or dislocations.  There is mild arthritic changes present      Large joint arthrocentesis: R knee  [place] Protocol:  procedure performed by consultantConsent: Verbal consent obtained. Written consent not obtained.  Risks and benefits: risks, benefits and alternatives were discussed  Consent given by: patient  Time out: Immediately prior to procedure a \"time out\" was called to verify the correct patient, procedure, equipment, support staff and site/side marked as required.  Patient understanding: patient states understanding of the procedure being performed  Test results: test results available and properly labeled  Site marked: the operative site was marked  Patient identity confirmed: verbally with patient  Supporting Documentation  Indications: pain   Procedure Details  Location: knee - R knee  Preparation: Patient was prepped and draped in the usual sterile fashion  Needle size: 22 G  Ultrasound guidance: no  Approach: lateral  Medications administered: 4 mL bupivacaine 0.25 %; 80 mg triamcinolone acetonide 40 mg/mL    Patient tolerance: patient tolerated the procedure well with no immediate complications  Dressing:  Sterile dressing applied                  "

## 2024-12-06 ENCOUNTER — HOSPITAL ENCOUNTER (OUTPATIENT)
Dept: MRI IMAGING | Facility: HOSPITAL | Age: 59
End: 2024-12-06
Attending: ORTHOPAEDIC SURGERY
Payer: COMMERCIAL

## 2024-12-06 DIAGNOSIS — S83.241A ACUTE MEDIAL MENISCUS TEAR OF RIGHT KNEE, INITIAL ENCOUNTER: ICD-10-CM

## 2024-12-06 PROCEDURE — 73721 MRI JNT OF LWR EXTRE W/O DYE: CPT

## 2024-12-13 ENCOUNTER — OFFICE VISIT (OUTPATIENT)
Dept: OBGYN CLINIC | Facility: CLINIC | Age: 59
End: 2024-12-13
Payer: COMMERCIAL

## 2024-12-13 ENCOUNTER — PREP FOR PROCEDURE (OUTPATIENT)
Dept: OBGYN CLINIC | Facility: CLINIC | Age: 59
End: 2024-12-13

## 2024-12-13 VITALS
HEART RATE: 87 BPM | WEIGHT: 192 LBS | DIASTOLIC BLOOD PRESSURE: 91 MMHG | BODY MASS INDEX: 31.99 KG/M2 | HEIGHT: 65 IN | SYSTOLIC BLOOD PRESSURE: 131 MMHG

## 2024-12-13 DIAGNOSIS — S83.242D OTHER TEAR OF MEDIAL MENISCUS OF LEFT KNEE AS CURRENT INJURY, SUBSEQUENT ENCOUNTER: Primary | ICD-10-CM

## 2024-12-13 DIAGNOSIS — Z01.812 PRE-OPERATIVE LABORATORY EXAMINATION: ICD-10-CM

## 2024-12-13 PROCEDURE — 99213 OFFICE O/P EST LOW 20 MIN: CPT | Performed by: ORTHOPAEDIC SURGERY

## 2024-12-13 RX ORDER — CHLORHEXIDINE GLUCONATE ORAL RINSE 1.2 MG/ML
15 SOLUTION DENTAL ONCE
OUTPATIENT
Start: 2024-12-13 | End: 2024-12-13

## 2024-12-13 RX ORDER — CHLORHEXIDINE GLUCONATE 40 MG/ML
SOLUTION TOPICAL DAILY PRN
OUTPATIENT
Start: 2024-12-13

## 2024-12-13 NOTE — PROGRESS NOTES
Assessment/Plan:   Diagnoses and all orders for this visit:    Other tear of medial meniscus of left knee as current injury, subsequent encounter    Pre-operative laboratory examination     Reviewed physical exam and MRI results with the patient at time of visit. The patient's MRI does show a tear of the medial meniscus. Discussed the patient's diagnosis and associated treatment options including conservative and surgical treatment. Discussed risks, potential complications, and benefits of surgical procedure in great detail. The patient understands the risks and benefits of all mention treatment options and has no further questions.  The patient has elected to proceed forward with a right knee arthroscopy with medial meniscectomy. Surgery is tentatively scheduled for January 20, 2025. She will be seen for follow-up 10 to 14 days post-op for reevaluation. A surgical consent was obtained at today's visit. The patient expressed understanding and had the opportunity to ask questions.     The patient has a tear of her medial meniscus of her right knee with arthritis.  Treatment options were discussed.  She is opted for elective right knee arthroscopy.  All risks, complications, and benefits were discussed with the patient in great detail including bleeding, infection, blood clots, pain, stiffness, neurovascular damage, fractures, dislocations, the possibility loss elective surgery, heart attack, wound problems, the need for further surgeries, etc.  Her surgery scheduled for January 20, 2025      Subjective:   Patient ID: Jessa Siddiqui  1965     HPI  Patient is a 59 y.o. female who presents for follow-up evaluation and MRI review of her right knee. The patient was last seen on 11/25/2024 where an MRI was ordered for suspicion of a meniscus tear. The patient does continue to experience pain in the medial aspect of the knee. She reports sharp pains that worsen with activity. The patient does have increased pain with  kneeling activities. She did have an injection approximately 3 weeks ago, she did experience relief; however, the pain is already increasing. The patient states that she does go camping where she has to kneel to put up a 50ft tent and to kayak as well.     The following portions of the patient's history were reviewed and updated as appropriate:  Past medical history, past surgical history, Family history, social history, current medications and allergies    Past Medical History:   Diagnosis Date    Arthritis        Past Surgical History:   Procedure Laterality Date    TUBAL LIGATION         Family History   Problem Relation Age of Onset    Arthritis Mother     Hypertension Father     Heart failure Father        Social History     Socioeconomic History    Marital status: /Civil Union     Spouse name: None    Number of children: None    Years of education: None    Highest education level: None   Occupational History    None   Tobacco Use    Smoking status: Every Day     Current packs/day: 1.00     Types: Cigarettes    Smokeless tobacco: Never   Vaping Use    Vaping status: Never Used   Substance and Sexual Activity    Alcohol use: No    Drug use: No    Sexual activity: None   Other Topics Concern    None   Social History Narrative    None     Social Drivers of Health     Financial Resource Strain: Not on file   Food Insecurity: Not on file   Transportation Needs: Not on file   Physical Activity: Not on file   Stress: Not on file   Social Connections: Not on file   Intimate Partner Violence: Not on file   Housing Stability: Not on file         Current Outpatient Medications:     Elastic Bandages & Supports (Knee Brace) MISC, Use daily as needed (pain), Disp: 1 each, Rfl: 0    meloxicam (Mobic) 15 mg tablet, Take 1 tablet (15 mg total) by mouth daily, Disp: 21 tablet, Rfl: 0    meclizine (ANTIVERT) 25 mg tablet, Take 1 tablet (25 mg total) by mouth every 8 (eight) hours (Patient not taking: Reported on  "12/13/2024), Disp: 30 tablet, Rfl: 0    multivitamin (THERAGRAN) TABS, Take 1 tablet by mouth daily (Patient not taking: Reported on 11/20/2024), Disp: , Rfl:     No Known Allergies    Review of Systems   Constitutional:  Negative for chills, fever and unexpected weight change.   HENT:  Negative for hearing loss, nosebleeds and sore throat.    Eyes:  Negative for pain, redness and visual disturbance.   Respiratory:  Negative for cough, shortness of breath and wheezing.    Cardiovascular:  Negative for chest pain, palpitations and leg swelling.   Gastrointestinal:  Negative for abdominal pain, nausea and vomiting.   Endocrine: Negative for polydipsia and polyuria.   Genitourinary:  Negative for dysuria and hematuria.   Skin:  Negative for rash and wound.   Neurological:  Negative for dizziness, numbness and headaches.   Psychiatric/Behavioral:  Negative for decreased concentration and suicidal ideas. The patient is not nervous/anxious.    All other systems reviewed and are negative.       Objective:  /91 (BP Location: Left arm, Patient Position: Sitting, Cuff Size: Large)   Pulse 87   Ht 5' 5\" (1.651 m)   Wt 87.1 kg (192 lb)   BMI 31.95 kg/m²     Ortho Exam  right knee(s) -   Patient ambulates with antalgic gait pattern  Uses No assistive device  No anatomical deformity  Skin is warm and dry to touch with no signs of erythema, ecchymosis, or infection   Mild generalized soft tissue swelling or effusion noted  ROM (0° - 115°)   Strength: 5/5 throughout  TTP over medial joint line  Flexor and extensor mechanisms are intact   Knee is stable to varus and valgus stress  - Lachman's  - Anterior Drawer, - Posterior Drawer  + Sharmaine's  Patella tracks centrally with palpable crepitus  Calf compartments are soft and supple  - Bong's sign  2+ DP and PT pulses with brisk capillary refill to the toes  Sural, saphenous, tibial, superficial, and deep peroneal motor and sensory distributions intact  Sensation light " touch intact distally        Physical Exam  HENT:      Head: Normocephalic and atraumatic.      Nose: Nose normal.   Eyes:      Conjunctiva/sclera: Conjunctivae normal.   Cardiovascular:      Rate and Rhythm: Normal rate.   Pulmonary:      Effort: Pulmonary effort is normal.   Musculoskeletal:      Cervical back: Neck supple.   Skin:     General: Skin is warm and dry.      Capillary Refill: Capillary refill takes less than 2 seconds.   Neurological:      Mental Status: She is alert and oriented to person, place, and time.   Psychiatric:         Mood and Affect: Mood normal.         Behavior: Behavior normal.          Diagnostic Test Review:  MRI of right knee obtained on 12/6/2024 were reviewed and demonstrate Complex tear of the medial meniscus as described, including radial tear of the posterior horn with peripheral extrusion. Medial compartment chondrosis. Intact lateral meniscus. Intact cruciate and collateral ligaments..      Procedures   None performed.       Scribe Attestation      I,:   am acting as a scribe while in the presence of the attending physician.:       I,:   personally performed the services described in this documentation    as scribed in my presence.:

## 2024-12-16 DIAGNOSIS — M25.561 RIGHT KNEE PAIN, UNSPECIFIED CHRONICITY: ICD-10-CM

## 2024-12-16 RX ORDER — MELOXICAM 15 MG/1
15 TABLET ORAL DAILY
Qty: 21 TABLET | Refills: 0 | Status: SHIPPED | OUTPATIENT
Start: 2024-12-16

## 2024-12-16 NOTE — TELEPHONE ENCOUNTER
Patient states she was supposed to have this filled at her appointment she recently had, but nothing was sent in. Please refill if possible.    Reason for call:   [x] Refill    []Prior Auth  [] Other:     Office:   [] PCP/Provider -   [x] Specialty/Provider - Ortho     Medication: Meloxicam     Dose/Frequency: 15 mg tablet taken by mouth once daily     Quantity: 21    Pharmacy: [id number]  [place] [address] [phone]     Does the patient have enough for 3 days?   [] Yes   [x] No - Send as HP to POD

## 2025-01-31 ENCOUNTER — TELEPHONE (OUTPATIENT)
Dept: OBGYN CLINIC | Facility: CLINIC | Age: 60
End: 2025-01-31

## 2025-01-31 NOTE — TELEPHONE ENCOUNTER
Returned call to patient letting her know her message was received and surgery has been cancelled.

## 2025-01-31 NOTE — TELEPHONE ENCOUNTER
Yes, this is Jessa Siddiqui calling. I have an appointment for surgery on 2/12/25 with Doctor Buckley at the [facility]. My knees been feeling a lot better and I decided at this time I'm not going to be doing the surgery. If you give me a call back at [phone] so that I know that you got this message to cancel the surgery, I'm not sure if I have to call the doctor's office myself. So give me a call back. Bye.

## 2025-05-17 NOTE — PROGRESS NOTES
[facility]        NAME: Felipe Ye is a 46 y o  female  : 1965    MRN: [id number]  DATE: 2019  TIME: 1:12 PM    Assessment and Plan   Tick bite of back, initial encounter [S30 860A, W57  XXXA]  1  Tick bite of back, initial encounter  doxycycline (ADOXA) 100 MG tablet    TDAP Vaccine greater than or equal to 8yo     Doxycycline 200 mgs one time dose  TDAP    Patient Instructions     Patient Instructions   Tick Bite   WHAT YOU NEED TO KNOW:   Most tick bites are not dangerous, but ticks can pass disease or infection when they bite  Ticks need to be removed quickly  You may have redness, pain, itching, and swelling near the bite  Blisters may also develop  DISCHARGE INSTRUCTIONS:   Return to the emergency department if:   · You have trouble walking or moving your legs  · You have joint pain, muscle pain, or muscle weakness within 1 month of a tick bite  · You have a fever, chills, headache, or rash  Contact your healthcare provider if:   · You cannot remove the tick  · The tick's head is stuck in your skin  · You have questions or concerns about your condition or care  Medicines:   · Medicines  help decrease pain, redness, itching, and swelling  You may also need medicine to prevent or fight a bacterial infection  These medicines may be given as a cream, lotion, or pill  · Take your medicine as directed  Contact your healthcare provider if you think your medicine is not helping or if you have side effects  Tell him of her if you are allergic to any medicine  Keep a list of the medicines, vitamins, and herbs you take  Include the amounts, and when and why you take them  Bring the list or the pill bottles to follow-up visits  Carry your medicine list with you in case of an emergency  How to remove a tick:  Remove the tick as soon as possible to help prevent disease or infection  You are less likely to get sick from a tick bite if you remove the tick within 24 hours  Called [facility] in [place], staff aware that pt is returning to facility after negative imaging.    Do not use petroleum jelly, nail polish, rubbing alcohol, or heat  These do not work and may be dangerous  Do the following to remove a tick:  · First, try a soapy cotton ball  Soak a cotton ball in liquid soap  Cover the tick with the cotton ball for 30 seconds  The tick may come off with the cotton ball when you pull it away  · Use tweezers if the soapy cotton ball does not work  Grasp the tick as close to your skin as possible  Pull the tick straight up and out  Do not touch the tick with your bare hands  · Do not twist or jerk the tick suddenly, because this may break off the tick's head or mouth parts  Do not leave any part of the tick in your skin  · Do not crush or squeeze the tick since its body may be infected with germs  Flush the tick down the toilet  · After the tick is removed, clean the area of the bite with rubbing alcohol  Then wash your hands with soap and water  Apply ice  on your bite for 15 to 20 minutes every hour or as directed  Use an ice pack, or put crushed ice in a plastic bag  Cover it with a towel before you apply it to your skin  Ice helps prevent tissue damage and decreases swelling and pain  Prevent a tick bite:  Ticks live in areas covered by brush and grass  They may even be found in your lawn if you live in certain areas  Outdoor pets can carry ticks inside the house  Ticks can grab onto you or your clothes when you walk by grass or brush  If you go into areas that contain many trees, tall grasses, and underbrush, do the following:  · Wear light colored pants and a long-sleeved shirt  Tuck your pants into your socks or boots  Tuck in your shirt  Wear sleeves that fit close to the skin at your wrists and neck  This will help prevent ticks from crawling through gaps in your clothing and onto your skin  Wear a hat in areas with trees  · Apply insect repellant on your skin  The insect repellant should contain DEET   Do not put insect repellant on skin that is cut, scratched, or irritated  Always use soap and water to wash the insect repellant off as soon as possible once you are indoors  Do not apply insect repellant on your child's face or hands  · Spray insect repellant onto your clothes  Use permethrin spray  This spray kills ticks that crawl on your clothing  Be sure to spray the tops of your boots, bottom of pant legs, and sleeve cuffs  As soon as possible, wash and dry clothing in hot water and high heat  · Check your clothing, hair, and skin for ticks  Shower within 2 hours of coming indoors  Carefully check the hairline, armpits, neck, and waist  Check your pets and children for ticks  Remove ticks from pets the same way as you remove them from people  · Decrease the risk for ticks in your yard  Ticks like to live in shady, moist areas  Beauford Ronel your lawn regularly to keep the grass short  Trim the grass around birdbaths and fences  Cut branches that are overgrown and take them out of the yard  Clear out leaf piles  Lakisha Pillo firewood in a dry, rio area  Follow up with your healthcare provider as directed:  Write down your questions so you remember to ask them during your visits  © 2017 [address] Information is for End User's use only and may not be sold, redistributed or otherwise used for commercial purposes  All illustrations and images included in CareNotes® are the copyrighted property of A D A M [employer] Inc  or Gus Baez  The above information is an  only  It is not intended as medical advice for individual conditions or treatments  Talk to your doctor, nurse or pharmacist before following any medical regimen to see if it is safe and effective for you  Follow up with PCP in 3-5 days  Proceed to  ER if symptoms worsen  Chief Complaint     Chief Complaint   Patient presents with    Tick Removal     tick bite to right side upper back           History of Present Illness       50-year-old female who presents with a tick bite to her right upper back  She thinks that it has been there for less than 24 hours but she is unsure it may have been 48 hours or longer  Tick was removed by family members  She believes that there may still be tick parts present  Her last tetanus shot is unknown  Patient has a history of Lyme disease 10 years ago was treated for 10 days on antibiotics  Patient did bring the tick with her and a small container and appears to be a deer tick , non engorged in the nymphal  Stage  Head  is missing  Review of Systems   Review of Systems   Constitutional: Negative for fatigue and fever  HENT: Negative for congestion  Cardiovascular: Negative for chest pain  Musculoskeletal: Negative for myalgias  Skin:        Tick bite to the right upper back  Neurological: Negative for headaches  Current Medications       Current Outpatient Medications:     multivitamin (THERAGRAN) TABS, Take 1 tablet by mouth daily, Disp: , Rfl:     doxycycline (ADOXA) 100 MG tablet, Take 2 tablets (200 mg total) by mouth once for 1 dose, Disp: 2 tablet, Rfl: 0    meclizine (ANTIVERT) 25 mg tablet, Take 1 tablet (25 mg total) by mouth every 8 (eight) hours (Patient not taking: Reported on 10/12/2019), Disp: 30 tablet, Rfl: 0    Current Allergies     Allergies as of 10/12/2019    (No Known Allergies)            The following portions of the patient's history were reviewed and updated as appropriate: allergies, current medications, past family history, past medical history, past social history, past surgical history and problem list      History reviewed  No pertinent past medical history  Past Surgical History:   Procedure Laterality Date    TUBAL LIGATION         Family History   Problem Relation Age of Onset    Arthritis Mother     Hypertension Father     Heart failure Father          Medications have been verified          Objective   /79   Pulse 97   Temp 97 8 °F (36 6 °C)   Resp 18 Ht 5' 6" (1 676 m)   Wt 82 1 kg (181 lb 1 6 oz)   SpO2 94%   BMI 29 23 kg/m²        Physical Exam     Physical Exam   Constitutional: She is oriented to person, place, and time  She appears well-developed and well-nourished  No distress  HENT:   Head: Normocephalic and atraumatic  Mouth/Throat: Oropharynx is clear and moist    Eyes: Pupils are equal, round, and reactive to light  Conjunctivae and EOM are normal    Neck: Normal range of motion  Neck supple  Cardiovascular: Normal rate  Pulmonary/Chest: Effort normal    Musculoskeletal: Normal range of motion  Neurological: She is alert and oriented to person, place, and time  Skin: Skin is warm and dry  No rash noted  No erythema  There is in a small 1/2 cm area on the left upper back where the tick was removed  There are no remaining parts  The skin is otherwise devoid of rash